# Patient Record
Sex: MALE | URBAN - METROPOLITAN AREA
[De-identification: names, ages, dates, MRNs, and addresses within clinical notes are randomized per-mention and may not be internally consistent; named-entity substitution may affect disease eponyms.]

---

## 2018-09-14 ENCOUNTER — OFFICE VISIT (OUTPATIENT)
Dept: URBAN - METROPOLITAN AREA CLINIC 11 | Facility: CLINIC | Age: 53
End: 2018-09-14
Payer: COMMERCIAL

## 2018-09-14 DIAGNOSIS — E11.9 TYPE 2 DIABETES MELLITUS W/O COMPLICATION: Primary | ICD-10-CM

## 2018-09-14 PROCEDURE — 92004 COMPRE OPH EXAM NEW PT 1/>: CPT | Performed by: OPTOMETRIST

## 2018-09-14 ASSESSMENT — VISUAL ACUITY
OD: 20/20
OS: 20/20

## 2018-09-14 ASSESSMENT — INTRAOCULAR PRESSURE
OD: 18
OS: 18

## 2018-09-14 NOTE — IMPRESSION/PLAN
Impression: Type 2 diabetes mellitus w/o complication: G67.4. Plan: No signs of retinopathy or neovascularization noted. Discussed ocular and systemic benefits of blood sugar control.  RTC 1yr complete exam

## 2019-11-25 ENCOUNTER — OFFICE VISIT (OUTPATIENT)
Dept: URBAN - METROPOLITAN AREA CLINIC 11 | Facility: CLINIC | Age: 54
End: 2019-11-25
Payer: COMMERCIAL

## 2019-11-25 PROCEDURE — 92014 COMPRE OPH EXAM EST PT 1/>: CPT | Performed by: OPTOMETRIST

## 2019-11-25 ASSESSMENT — INTRAOCULAR PRESSURE
OD: 21
OS: 21

## 2019-11-25 NOTE — IMPRESSION/PLAN
Impression: Type 2 diabetes mellitus w/o complication: X09.7. Plan: No signs of retinopathy or neovascularization noted. Discussed ocular and systemic benefits of blood sugar control.  RTC 1yr complete exam